# Patient Record
Sex: FEMALE | Race: WHITE
[De-identification: names, ages, dates, MRNs, and addresses within clinical notes are randomized per-mention and may not be internally consistent; named-entity substitution may affect disease eponyms.]

---

## 2019-04-26 ENCOUNTER — HOSPITAL ENCOUNTER (OUTPATIENT)
Dept: HOSPITAL 61 - KCIC MRI | Age: 64
Discharge: HOME | End: 2019-04-26
Attending: FAMILY MEDICINE
Payer: COMMERCIAL

## 2019-04-26 DIAGNOSIS — J34.89: ICD-10-CM

## 2019-04-26 DIAGNOSIS — I10: ICD-10-CM

## 2019-04-26 DIAGNOSIS — I63.89: Primary | ICD-10-CM

## 2019-04-26 PROCEDURE — 82565 ASSAY OF CREATININE: CPT

## 2019-04-26 PROCEDURE — A9585 GADOBUTROL INJECTION: HCPCS

## 2019-04-26 PROCEDURE — 70553 MRI BRAIN STEM W/O & W/DYE: CPT

## 2019-04-26 NOTE — [facility]
MRI of the Brain without and with Contrast 4/26/2019

 

Clinical History: History of abnormality seen on outside CT scan of the 

head.

 

Technique: Unenhanced T1-weighted sagittal and axial and FLAIR, 

T2-weighted, gradient echo and diffusion-weighted axial images of the 

brain were obtained. After the intravenous administration of 7.5 cc of 

Gadavist, enhanced T1-weighted axial and coronal images of the brain were 

obtained.

 

Findings: The patient's outside CT scan of the head is unavailable for 

comparison. The report from that study is unavailable for comparison.

 

The ventricles and sulci are within normal limits in size and 

configuration. Patchy and small scattered areas of abnormally increased 

signal intensity are seen within the periventricular and subcortical white

matter of both cerebral hemispheres on the FLAIR and T2-weighted images 

consistent with areas of minimal small vessel ischemic disease. Small old 

areas of infarction are seen involving the right cerebellar hemisphere. 

These measure 2 to 3 mm in size.

 

No acute parenchymal abnormality is seen. No abnormal area of contrast 

enhancement is noted. No extra-axial fluid collection is seen. There is no

MRI evidence of acute ischemia/infarction.

 

Mild mucosal thickening is seen scattered throughout the paranasal 

sinuses. Normal flow voids are seen within the major vascular structures 

surrounding the brain parenchyma.

 

Impression:  No acute parenchymal abnormality is seen.

 

Electronically signed by: Singh Ch MD (4/26/2019 1:03 PM) [facility]

## 2021-01-12 ENCOUNTER — HOSPITAL ENCOUNTER (EMERGENCY)
Dept: HOSPITAL 61 - ER | Age: 66
Discharge: HOME | End: 2021-01-12
Payer: COMMERCIAL

## 2021-01-12 VITALS — BODY MASS INDEX: 29.38 KG/M2 | HEIGHT: 65 IN | WEIGHT: 176.37 LBS

## 2021-01-12 VITALS — DIASTOLIC BLOOD PRESSURE: 98 MMHG | SYSTOLIC BLOOD PRESSURE: 154 MMHG

## 2021-01-12 DIAGNOSIS — I10: ICD-10-CM

## 2021-01-12 DIAGNOSIS — Z98.51: ICD-10-CM

## 2021-01-12 DIAGNOSIS — E11.9: ICD-10-CM

## 2021-01-12 DIAGNOSIS — E03.9: ICD-10-CM

## 2021-01-12 DIAGNOSIS — R04.0: Primary | ICD-10-CM

## 2021-01-12 DIAGNOSIS — E78.00: ICD-10-CM

## 2021-01-12 LAB
BASOPHILS # BLD AUTO: 0.1 X10^3/UL (ref 0–0.2)
BASOPHILS NFR BLD: 1 % (ref 0–3)
EOSINOPHIL NFR BLD: 0.2 X10^3/UL (ref 0–0.7)
EOSINOPHIL NFR BLD: 2 % (ref 0–3)
ERYTHROCYTE [DISTWIDTH] IN BLOOD BY AUTOMATED COUNT: 13.3 % (ref 11.5–14.5)
HCT VFR BLD CALC: 42 % (ref 36–47)
HGB BLD-MCNC: 14.4 G/DL (ref 12–15.5)
LYMPHOCYTES # BLD: 3.2 X10^3/UL (ref 1–4.8)
LYMPHOCYTES NFR BLD AUTO: 33 % (ref 24–48)
MCH RBC QN AUTO: 32 PG (ref 25–35)
MCHC RBC AUTO-ENTMCNC: 34 G/DL (ref 31–37)
MCV RBC AUTO: 92 FL (ref 79–100)
MONO #: 0.4 X10^3/UL (ref 0–1.1)
MONOCYTES NFR BLD: 4 % (ref 0–9)
NEUT #: 6 X10^3/UL (ref 1.8–7.7)
NEUTROPHILS NFR BLD AUTO: 60 % (ref 31–73)
PLATELET # BLD AUTO: 536 X10^3/UL (ref 140–400)
RBC # BLD AUTO: 4.56 X10^6/UL (ref 3.5–5.4)
WBC # BLD AUTO: 9.9 X10^3/UL (ref 4–11)

## 2021-01-12 PROCEDURE — 36415 COLL VENOUS BLD VENIPUNCTURE: CPT

## 2021-01-12 PROCEDURE — 85025 COMPLETE CBC W/AUTO DIFF WBC: CPT

## 2021-01-12 PROCEDURE — 99283 EMERGENCY DEPT VISIT LOW MDM: CPT

## 2021-01-12 NOTE — ED.ADGEN
Past Medical History


Past Medical History:  Diabetes-Type II, High Cholesterol, Hypertension, 

Hypothyroid


Past Surgical History:  Tubal ligation


Smoking Status:  Never Smoker


Alcohol Use:  Occasionally





General Adult


EDM:


Chief Complaint:  NOSEBLEED





HPI:


HPI:





Patient is a 65  year old female who presents to the emergency department with 

complaints of 2 nosebleeds this morning.  Patient states that she has had 2 

episodes of nosebleeding from her right nare this morning.  She reports after 

the first nosebleed she began to have chills. Patient denies any fever, nausea, 

vomiting, vision changes, headache, numbness, tingling, or weakness.  She denies

any recent nasal congestion or injury to her nose.  Patient also denies any use 

of blood thinners.  She currently denies any pain.





Review of Systems:


Review of Systems:


Complete ROS is negative unless otherwise noted in HPI.





Allergies:


Allergies:





Allergies








Coded Allergies Type Severity Reaction Last Updated Verified


 


  No Known Drug Allergies    4/26/19 No











Physical Exam:


PE:


See Above


Constitutional: Well developed, well nourished, no acute distress, non-toxic 

appearance. []


HENT: Normocephalic, atraumatic, bilateral external ears normal; erythemic nasal

 mucosa with no active bleeding present in the right nare


Eyes: PERRLA, EOMI, conjunctiva normal, no discharge. [] 


Neck: Normal range of motion, no stridor. [] 


Cardiovascular:Heart rate regular rhythm


Lungs & Thorax:  Respirations even and unlabored, no retractions, no respiratory

 distress


Skin: Warm, dry, no erythema, no rash. [] 


Extremities: No cyanosis, ROM intact, no edema. [] 


Neurologic: Alert and oriented X 3, no focal deficits noted. []


Psychologic: Affect normal, judgement normal, mood normal. []





Current Patient Data:


Labs:





                                Laboratory Tests








Test


 1/12/21


13:45


 


White Blood Count


 9.9 x10^3/uL


(4.0-11.0)


 


Red Blood Count


 4.56 x10^6/uL


(3.50-5.40)


 


Hemoglobin


 14.4 g/dL


(12.0-15.5)


 


Hematocrit


 42.0 %


(36.0-47.0)


 


Mean Corpuscular Volume


 92 fL ()





 


Mean Corpuscular Hemoglobin 32 pg (25-35)  


 


Mean Corpuscular Hemoglobin


Concent 34 g/dL


(31-37)


 


Red Cell Distribution Width


 13.3 %


(11.5-14.5)


 


Platelet Count


 536 x10^3/uL


(140-400)  H


 


Neutrophils (%) (Auto) 60 % (31-73)  


 


Lymphocytes (%) (Auto) 33 % (24-48)  


 


Monocytes (%) (Auto) 4 % (0-9)  


 


Eosinophils (%) (Auto) 2 % (0-3)  


 


Basophils (%) (Auto) 1 % (0-3)  


 


Neutrophils # (Auto)


 6.0 x10^3/uL


(1.8-7.7)


 


Lymphocytes # (Auto)


 3.2 x10^3/uL


(1.0-4.8)


 


Monocytes # (Auto)


 0.4 x10^3/uL


(0.0-1.1)


 


Eosinophils # (Auto)


 0.2 x10^3/uL


(0.0-0.7)


 


Basophils # (Auto)


 0.1 x10^3/uL


(0.0-0.2)





                                Laboratory Tests


1/12/21 13:45











Vital Signs:





                                   Vital Signs








  Date Time  Temp Pulse Resp B/P (MAP) Pulse Ox O2 Delivery O2 Flow Rate FiO2


 


1/12/21 13:12 97.9 94 18 154/98 (116) 95 Room Air  





 97.9       











EKG:


EKG:


[]





Heart Score:


Risk Factors:


Risk Factors:  DM, Current or recent (<one month) smoker, HTN, HLP, family histo

ry of CAD, obesity.


Risk Scores:


Score 0 - 3:  2.5% MACE over next 6 weeks - Discharge Home


Score 4 - 6:  20.3% MACE over next 6 weeks - Admit for Clinical Observation


Score 7 - 10:  72.7% MACE over next 6 weeks - Early Invasive Strategies





Radiology/Procedures:


Radiology/Procedures:


[]





Course & Med Decision Making:


Course & Med Decision Making


Pertinent Labs and Imaging studies reviewed. (See chart for details)





[]





Dragon Disclaimer:


Dragon Disclaimer:


This electronic medical record was generated, in whole or in part, using a voice

 recognition dictation system.





Departure


Departure


Impression:  


   Primary Impression:  


   Bleeding nose


Disposition:  01 DC HOME SELF CARE/HOMELESS


Condition:  STABLE


Referrals:  


CRUZ JIMÉNEZ MD (PCP)


Patient Instructions:  Nosebleed, Easy-to-Read





Additional Instructions:  


Do not stick anything in your nose or blow your nose as discussed.  Recommend 

using a coolmist humidifier.  Follow-up with your primary care doctor in 1 to 2 

days, return to the ER if symptoms worsen or fever develops.











ANKUSH FATIMA       Jan 12, 2021 14:26